# Patient Record
(demographics unavailable — no encounter records)

---

## 2025-03-06 NOTE — ASSESSMENT
[FreeTextEntry1] : Carotid Doppler February 8, 2024 showed no plaque or stenosis bilaterally.  Echocardiogram February 8, 2024 demonstrated left ventricle normal in size and function with ejection fraction of 60 to 65%.  Mild mitral, trace aortic and mild tricuspid regurgitation.  Borderline dilatation of the aorta to 3.6 cm.  Nuclear stress test November 21, 2024 during which patient initially exercised via a Sylvain protocol for 1 minute and 21 seconds, totaling 2 METS.  Peak heart rate achieved was 86 bpm, representing 57% of age-predicted maximum.  Blood pressure was elevated at baseline with a normal response to exercise.  Following submaximal exercise stress test patient was converted to a pharmacologic stress test which was tolerated well.  Blood pressure response to infusion was normal.  EKG showed no evidence of ischemia with a brief run of SVT noted during stress.  Nuclear ridging showed no evidence of ischemia or infarct and ejection fraction of 81%.  EKG: Sinus rhythm with no significant ST or T wave changes.  Poor R-wave progression.  69-year-old female with a past medical history of hypertension, ovarian cancer status post surgery and chemotherapy with cisplatin who presents to me for follow-up evaluation.  Patient presents back doing overall better.  The cramping in her leg seems to be somewhat better although it did limit her at the time of her stress test.  Ultimately her stress test showed no evidence of ischemia or infarct and a normal EF.  She certainly should be exercising more.  She also needs to make sure she is hydrating.  She also notes that she has lost about 10 pounds over the last 6 months without trying.  I have encouraged her to follow-up with her primary although this could be just secondary to the fact that she has not been very good about eating.  EKG appears essentially unchanged.  No evidence of heart failure.  Blood pressure control appears to be reasonable although her diastolics get a little high sometimes.  She will check the more frequently but I would not make any changes for today.

## 2025-03-06 NOTE — PHYSICAL EXAM
[Well Developed] : well developed [Well Nourished] : well nourished [No Acute Distress] : no acute distress [Normal Conjunctiva] : normal conjunctiva [Normal Venous Pressure] : normal venous pressure [Normal S1, S2] : normal S1, S2 [No Murmur] : no murmur [No Rub] : no rub [No Gallop] : no gallop [Clear Lung Fields] : clear lung fields [Good Air Entry] : good air entry [No Respiratory Distress] : no respiratory distress  [Soft] : abdomen soft [Non Tender] : non-tender [No Masses/organomegaly] : no masses/organomegaly [Normal Bowel Sounds] : normal bowel sounds [Normal Gait] : normal gait [No Edema] : no edema [No Cyanosis] : no cyanosis [No Clubbing] : no clubbing [No Varicosities] : no varicosities [Moves all extremities] : moves all extremities [No Focal Deficits] : no focal deficits [Normal Speech] : normal speech [Alert and Oriented] : alert and oriented [Normal memory] : normal memory [de-identified] : Positive right bruit, no left bruit

## 2025-03-06 NOTE — DISCUSSION/SUMMARY
[FreeTextEntry1] : 1.  No additional cardiac testing at this time. 2.  Continue current cardiac meds in doses as noted above for hypertension with amlodipine 10 mg daily. 3.  Monitor BP at home, keep a log and bring to f/u. 4.  Patient strongly encouraged on reducing salt intake. 5.  Encourage patient to be as active as possible. 6.  Encourage patient to be better about taking more calories.  Follow-up with her primary regarding her weight loss. 7.  Follow up here in six months. [EKG obtained to assist in diagnosis and management of assessed problem(s)] : EKG obtained to assist in diagnosis and management of assessed problem(s)